# Patient Record
Sex: FEMALE | Race: WHITE | NOT HISPANIC OR LATINO | Employment: OTHER | URBAN - METROPOLITAN AREA
[De-identification: names, ages, dates, MRNs, and addresses within clinical notes are randomized per-mention and may not be internally consistent; named-entity substitution may affect disease eponyms.]

---

## 2017-03-02 ENCOUNTER — OFFICE VISIT - HEALTHEAST (OUTPATIENT)
Dept: FAMILY MEDICINE | Facility: CLINIC | Age: 47
End: 2017-03-02

## 2017-03-02 DIAGNOSIS — M25.571 RIGHT ANKLE PAIN: ICD-10-CM

## 2017-03-02 DIAGNOSIS — R76.8 HEPATITIS C ANTIBODY TEST POSITIVE: ICD-10-CM

## 2017-03-02 DIAGNOSIS — E66.3 OVERWEIGHT: ICD-10-CM

## 2017-03-02 DIAGNOSIS — Z00.00 WELL ADULT EXAM: ICD-10-CM

## 2017-03-02 DIAGNOSIS — Z72.0 TOBACCO ABUSE: ICD-10-CM

## 2017-03-02 LAB
CHOLEST SERPL-MCNC: 218 MG/DL
FASTING STATUS PATIENT QL REPORTED: YES
HDLC SERPL-MCNC: 54 MG/DL
LDLC SERPL CALC-MCNC: 147 MG/DL
TRIGL SERPL-MCNC: 87 MG/DL

## 2017-03-02 ASSESSMENT — MIFFLIN-ST. JEOR: SCORE: 1378.46

## 2017-03-03 LAB — HCV RNA DETECT/QUANT, S - HISTORICAL: NORMAL IU/ML

## 2017-03-06 ENCOUNTER — COMMUNICATION - HEALTHEAST (OUTPATIENT)
Dept: FAMILY MEDICINE | Facility: CLINIC | Age: 47
End: 2017-03-06

## 2017-03-07 LAB
HPV INTERPRETATION - HISTORICAL: NORMAL
HPV INTERPRETER - HISTORICAL: NORMAL

## 2017-03-08 LAB
BKR LAB AP ABNORMAL BLEEDING: NO
BKR LAB AP BIRTH CONTROL/HORMONES: NORMAL
BKR LAB AP CERVICAL APPEARANCE: NORMAL
BKR LAB AP GYN ADEQUACY: NORMAL
BKR LAB AP GYN INTERPRETATION: NORMAL
BKR LAB AP HPV REFLEX: NORMAL
BKR LAB AP LMP: NORMAL
BKR LAB AP PATIENT STATUS: NORMAL
BKR LAB AP PREVIOUS ABNORMAL: NO
BKR LAB AP PREVIOUS NORMAL: 2015
HIGH RISK?: NO
PATH REPORT.COMMENTS IMP SPEC: NORMAL
RESULT FLAG (HE HISTORICAL CONVERSION): NORMAL

## 2017-03-14 ENCOUNTER — COMMUNICATION - HEALTHEAST (OUTPATIENT)
Dept: FAMILY MEDICINE | Facility: CLINIC | Age: 47
End: 2017-03-14

## 2017-03-16 ENCOUNTER — HOSPITAL ENCOUNTER (OUTPATIENT)
Dept: MAMMOGRAPHY | Facility: HOSPITAL | Age: 47
Discharge: HOME | End: 2017-03-16
Attending: FAMILY MEDICINE

## 2017-03-16 DIAGNOSIS — Z00.00 WELL ADULT EXAM: ICD-10-CM

## 2017-05-23 ENCOUNTER — COMMUNICATION - HEALTHEAST (OUTPATIENT)
Dept: FAMILY MEDICINE | Facility: CLINIC | Age: 47
End: 2017-05-23

## 2017-05-23 DIAGNOSIS — Z80.8 FAMILY HISTORY OF SKIN CANCER: ICD-10-CM

## 2017-06-20 ENCOUNTER — COMMUNICATION - HEALTHEAST (OUTPATIENT)
Dept: SCHEDULING | Facility: CLINIC | Age: 47
End: 2017-06-20

## 2017-07-28 ENCOUNTER — COMMUNICATION - HEALTHEAST (OUTPATIENT)
Dept: FAMILY MEDICINE | Facility: CLINIC | Age: 47
End: 2017-07-28

## 2017-09-28 ENCOUNTER — COMMUNICATION - HEALTHEAST (OUTPATIENT)
Dept: SCHEDULING | Facility: CLINIC | Age: 47
End: 2017-09-28

## 2017-10-05 ENCOUNTER — AMBULATORY - HEALTHEAST (OUTPATIENT)
Dept: FAMILY MEDICINE | Facility: CLINIC | Age: 47
End: 2017-10-05

## 2017-10-09 ENCOUNTER — AMBULATORY - HEALTHEAST (OUTPATIENT)
Dept: NURSING | Facility: CLINIC | Age: 47
End: 2017-10-09

## 2017-10-09 DIAGNOSIS — Z23 ENCOUNTER FOR ZOSTAVAX ADMINISTRATION: ICD-10-CM

## 2018-04-12 ENCOUNTER — OFFICE VISIT - HEALTHEAST (OUTPATIENT)
Dept: FAMILY MEDICINE | Facility: CLINIC | Age: 48
End: 2018-04-12

## 2018-04-12 DIAGNOSIS — A60.00 GENITAL HERPES: ICD-10-CM

## 2018-04-12 DIAGNOSIS — R76.8 HEPATITIS C ANTIBODY TEST POSITIVE: ICD-10-CM

## 2018-04-12 DIAGNOSIS — Z12.31 VISIT FOR SCREENING MAMMOGRAM: ICD-10-CM

## 2018-04-12 DIAGNOSIS — Z12.11 ENCOUNTER FOR COLORECTAL CANCER SCREENING: ICD-10-CM

## 2018-04-12 DIAGNOSIS — Z00.01 ENCOUNTER FOR ROUTINE ADULT HEALTH EXAMINATION WITH ABNORMAL FINDINGS: ICD-10-CM

## 2018-04-12 DIAGNOSIS — Z12.12 ENCOUNTER FOR COLORECTAL CANCER SCREENING: ICD-10-CM

## 2018-04-12 DIAGNOSIS — E78.49 OTHER HYPERLIPIDEMIA: ICD-10-CM

## 2018-04-12 DIAGNOSIS — E66.3 OVERWEIGHT: ICD-10-CM

## 2018-04-12 LAB
ALBUMIN SERPL-MCNC: 3.8 G/DL (ref 3.5–5)
ALP SERPL-CCNC: 74 U/L (ref 45–120)
ALT SERPL W P-5'-P-CCNC: 17 U/L (ref 0–45)
ANION GAP SERPL CALCULATED.3IONS-SCNC: 7 MMOL/L (ref 5–18)
AST SERPL W P-5'-P-CCNC: 18 U/L (ref 0–40)
BILIRUB SERPL-MCNC: 0.5 MG/DL (ref 0–1)
BUN SERPL-MCNC: 22 MG/DL (ref 8–22)
CALCIUM SERPL-MCNC: 9.3 MG/DL (ref 8.5–10.5)
CHLORIDE BLD-SCNC: 107 MMOL/L (ref 98–107)
CHOLEST SERPL-MCNC: 215 MG/DL
CO2 SERPL-SCNC: 27 MMOL/L (ref 22–31)
CREAT SERPL-MCNC: 0.79 MG/DL (ref 0.6–1.1)
FASTING STATUS PATIENT QL REPORTED: YES
GFR SERPL CREATININE-BSD FRML MDRD: >60 ML/MIN/1.73M2
GLUCOSE BLD-MCNC: 78 MG/DL (ref 70–125)
HDLC SERPL-MCNC: 57 MG/DL
LDLC SERPL CALC-MCNC: 142 MG/DL
POTASSIUM BLD-SCNC: 4.6 MMOL/L (ref 3.5–5)
PROT SERPL-MCNC: 6.9 G/DL (ref 6–8)
SODIUM SERPL-SCNC: 141 MMOL/L (ref 136–145)
TRIGL SERPL-MCNC: 82 MG/DL

## 2018-04-12 ASSESSMENT — MIFFLIN-ST. JEOR: SCORE: 1337.31

## 2018-04-13 LAB — HCV RNA DETECT/QUANT, S: NORMAL IU/ML

## 2018-04-16 ENCOUNTER — COMMUNICATION - HEALTHEAST (OUTPATIENT)
Dept: FAMILY MEDICINE | Facility: CLINIC | Age: 48
End: 2018-04-16

## 2018-04-17 ENCOUNTER — HOSPITAL ENCOUNTER (OUTPATIENT)
Dept: ULTRASOUND IMAGING | Facility: HOSPITAL | Age: 48
Discharge: HOME | End: 2018-04-17
Attending: FAMILY MEDICINE

## 2018-04-17 ENCOUNTER — COMMUNICATION - HEALTHEAST (OUTPATIENT)
Dept: FAMILY MEDICINE | Facility: CLINIC | Age: 48
End: 2018-04-17

## 2018-04-17 DIAGNOSIS — R76.8 HEPATITIS C ANTIBODY TEST POSITIVE: ICD-10-CM

## 2018-05-14 ENCOUNTER — HOSPITAL ENCOUNTER (OUTPATIENT)
Dept: MAMMOGRAPHY | Facility: CLINIC | Age: 48
Discharge: HOME | End: 2018-05-14
Attending: FAMILY MEDICINE

## 2018-05-14 DIAGNOSIS — Z12.31 VISIT FOR SCREENING MAMMOGRAM: ICD-10-CM

## 2018-06-06 ENCOUNTER — COMMUNICATION - HEALTHEAST (OUTPATIENT)
Dept: FAMILY MEDICINE | Facility: CLINIC | Age: 48
End: 2018-06-06

## 2018-06-07 ENCOUNTER — RECORDS - HEALTHEAST (OUTPATIENT)
Dept: ADMINISTRATIVE | Facility: OTHER | Age: 48
End: 2018-06-07

## 2018-11-19 ENCOUNTER — COMMUNICATION - HEALTHEAST (OUTPATIENT)
Dept: SCHEDULING | Facility: CLINIC | Age: 48
End: 2018-11-19

## 2019-03-13 ENCOUNTER — COMMUNICATION - HEALTHEAST (OUTPATIENT)
Dept: FAMILY MEDICINE | Facility: CLINIC | Age: 49
End: 2019-03-13

## 2019-03-13 DIAGNOSIS — B00.9 HERPES SIMPLEX VIRUS INFECTION: ICD-10-CM

## 2019-05-21 ENCOUNTER — OFFICE VISIT - HEALTHEAST (OUTPATIENT)
Dept: FAMILY MEDICINE | Facility: CLINIC | Age: 49
End: 2019-05-21

## 2019-05-21 DIAGNOSIS — A60.04 HERPES SIMPLEX VULVOVAGINITIS: ICD-10-CM

## 2019-05-21 DIAGNOSIS — Z12.11 SCREEN FOR COLON CANCER: ICD-10-CM

## 2019-05-21 DIAGNOSIS — E78.49 OTHER HYPERLIPIDEMIA: ICD-10-CM

## 2019-05-21 DIAGNOSIS — Z00.00 ENCOUNTER FOR GENERAL ADULT MEDICAL EXAMINATION WITHOUT ABNORMAL FINDINGS: ICD-10-CM

## 2019-05-21 DIAGNOSIS — Z12.4 ENCOUNTER FOR SCREENING FOR CERVICAL CANCER: ICD-10-CM

## 2019-05-21 DIAGNOSIS — Z11.3 SCREEN FOR STD (SEXUALLY TRANSMITTED DISEASE): ICD-10-CM

## 2019-05-21 DIAGNOSIS — B00.9 HERPES SIMPLEX VIRUS INFECTION: ICD-10-CM

## 2019-05-21 DIAGNOSIS — Z12.31 VISIT FOR SCREENING MAMMOGRAM: ICD-10-CM

## 2019-05-21 DIAGNOSIS — R76.8 HEPATITIS C ANTIBODY TEST POSITIVE: ICD-10-CM

## 2019-05-21 LAB
ALBUMIN SERPL-MCNC: 4.3 G/DL (ref 3.5–5)
ALP SERPL-CCNC: 67 U/L (ref 45–120)
ALT SERPL W P-5'-P-CCNC: 13 U/L (ref 0–45)
ANION GAP SERPL CALCULATED.3IONS-SCNC: 10 MMOL/L (ref 5–18)
AST SERPL W P-5'-P-CCNC: 16 U/L (ref 0–40)
BILIRUB SERPL-MCNC: 0.4 MG/DL (ref 0–1)
BUN SERPL-MCNC: 25 MG/DL (ref 8–22)
CALCIUM SERPL-MCNC: 9.7 MG/DL (ref 8.5–10.5)
CHLORIDE BLD-SCNC: 103 MMOL/L (ref 98–107)
CHOLEST SERPL-MCNC: 229 MG/DL
CO2 SERPL-SCNC: 25 MMOL/L (ref 22–31)
CREAT SERPL-MCNC: 0.78 MG/DL (ref 0.6–1.1)
FASTING STATUS PATIENT QL REPORTED: YES
GFR SERPL CREATININE-BSD FRML MDRD: >60 ML/MIN/1.73M2
GLUCOSE BLD-MCNC: 87 MG/DL (ref 70–125)
HDLC SERPL-MCNC: 68 MG/DL
HIV 1+2 AB+HIV1 P24 AG SERPL QL IA: NEGATIVE
LDLC SERPL CALC-MCNC: 146 MG/DL
POTASSIUM BLD-SCNC: 4.5 MMOL/L (ref 3.5–5)
PROT SERPL-MCNC: 7.4 G/DL (ref 6–8)
SODIUM SERPL-SCNC: 138 MMOL/L (ref 136–145)
TRIGL SERPL-MCNC: 77 MG/DL

## 2019-05-21 ASSESSMENT — MIFFLIN-ST. JEOR: SCORE: 1331.18

## 2019-05-22 LAB
C TRACH DNA SPEC QL PROBE+SIG AMP: NEGATIVE
HPV SOURCE: NORMAL
HUMAN PAPILLOMA VIRUS 16 DNA: NEGATIVE
HUMAN PAPILLOMA VIRUS 18 DNA: NEGATIVE
HUMAN PAPILLOMA VIRUS FINAL DIAGNOSIS: NORMAL
HUMAN PAPILLOMA VIRUS OTHER HR: NEGATIVE
N GONORRHOEA DNA SPEC QL NAA+PROBE: NEGATIVE
SPECIMEN DESCRIPTION: NORMAL
T PALLIDUM AB SER QL: NEGATIVE

## 2019-05-23 LAB
HCV RNA SERPL NAA+PROBE-ACNC: NORMAL [IU]/ML
HCV RNA SERPL NAA+PROBE-LOG IU: NORMAL LOG IU/ML

## 2019-05-28 ENCOUNTER — COMMUNICATION - HEALTHEAST (OUTPATIENT)
Dept: FAMILY MEDICINE | Facility: CLINIC | Age: 49
End: 2019-05-28

## 2019-05-30 ENCOUNTER — COMMUNICATION - HEALTHEAST (OUTPATIENT)
Dept: FAMILY MEDICINE | Facility: CLINIC | Age: 49
End: 2019-05-30

## 2019-05-30 DIAGNOSIS — L98.9 SKIN LESION: ICD-10-CM

## 2019-06-07 ENCOUNTER — RECORDS - HEALTHEAST (OUTPATIENT)
Dept: MAMMOGRAPHY | Facility: CLINIC | Age: 49
End: 2019-06-07

## 2019-06-07 DIAGNOSIS — Z12.31 ENCOUNTER FOR SCREENING MAMMOGRAM FOR MALIGNANT NEOPLASM OF BREAST: ICD-10-CM

## 2019-10-09 ENCOUNTER — RECORDS - HEALTHEAST (OUTPATIENT)
Dept: ADMINISTRATIVE | Facility: OTHER | Age: 49
End: 2019-10-09

## 2020-08-18 ENCOUNTER — COMMUNICATION - HEALTHEAST (OUTPATIENT)
Dept: FAMILY MEDICINE | Facility: CLINIC | Age: 50
End: 2020-08-18

## 2020-08-18 DIAGNOSIS — B00.9 HERPES SIMPLEX VIRUS INFECTION: ICD-10-CM

## 2020-08-19 RX ORDER — VALACYCLOVIR HYDROCHLORIDE 500 MG/1
TABLET, FILM COATED ORAL
Qty: 24 TABLET | Refills: 3 | Status: SHIPPED | OUTPATIENT
Start: 2020-08-19 | End: 2021-10-01

## 2021-05-25 ENCOUNTER — RECORDS - HEALTHEAST (OUTPATIENT)
Dept: ADMINISTRATIVE | Facility: CLINIC | Age: 51
End: 2021-05-25

## 2021-05-27 ENCOUNTER — RECORDS - HEALTHEAST (OUTPATIENT)
Dept: ADMINISTRATIVE | Facility: CLINIC | Age: 51
End: 2021-05-27

## 2021-05-29 NOTE — TELEPHONE ENCOUNTER
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 Patient has a follow up appointment scheduled tomorrow 05/31/2019, and was informed she will need a referral prior to being treated, or she will be financially responsible for all services rendered.   Have you been seen for this request: No:  Appointment Offered:  declined  Does patient have a preference on a group/provider?   [facility]  Okay to leave a detailed message?  Yes

## 2021-05-30 VITALS — WEIGHT: 171.38 LBS | BODY MASS INDEX: 28.55 KG/M2 | HEIGHT: 65 IN

## 2021-06-01 VITALS — BODY MASS INDEX: 26.87 KG/M2 | WEIGHT: 161.25 LBS | HEIGHT: 65 IN

## 2021-06-03 VITALS — BODY MASS INDEX: 26.64 KG/M2 | WEIGHT: 159.9 LBS | HEIGHT: 65 IN

## 2021-06-09 NOTE — PROGRESS NOTES
Assessment:      Healthy female exam.    1. Well adult exam  Mammo Screening Bilateral    Lipid [place]    Gynecologic Cytology (PAP Smear)    CANCELED: Glucose   2. Tobacco abuse     3. Hepatitis C antibody test positive  Hepatitis C Virus (HCV) RNA Detection and Quantification by RT-PCR    Comprehensive Metabolic Panel   4. Right ankle pain     5. Overweight       The following high BMI interventions were performed this visit: encouragement to exercise  I have counseled the patient for tobacco cessation and the follow up will occur  at the next visit.     Plan:      I encouraged her to continue exercising and eating healthy diet.  We are going to check the above labs including a hepatitis C quant.  We discussed smoking cessation and I encouraged her to quit.  I informed her that she is not technically due for another Pap smear, but she is requesting to have this done.  The pain in her right ankle appears to be due to an ankle sprain.  She is not interested in having an x-ray which I think is appropriate since there is no bony tenderness and her symptoms are slowly, but steadily improving.     All questions answered.  Await pap smear results.  Discussed healthy lifestyle modifications.  Follow up as needed.     Subjective:      Lillian Hartman is a 46 y.o. female who presents for an annual exam. The patient is not sexually active. The patient participates in regular exercise: yes. The patient reports that there is not domestic violence in her life.  She denies having any concerns regarding hearing, vision, urination, bowel movements, sleep or mood.  She has a history of a positive hepatitis C antibody test, but her hepatitis C quadrant is undetectable.  She has been working out at a boxing gym for exercise.  She has been eating a healthy diet and has cut back on smoking to once a week.  She is concerned about right sided ankle pain.  She injured it jumping off a step about 2 months ago.  She denies having  swelling.  She is able to ambulate without much difficulty, but she describes having discomfort with jumping or if she is wearing high heels.    Social history: Single, not sexually active.  She works as a .    Healthy Habits:   Regular Exercise: Yes  Sunscreen Use: Yes  Healthy Diet: Yes  Dental Visits Regularly: Yes  Seat Belt: Yes  Sexually active: No  Self Breast Exam Monthly:Yes  Hemoccults: No  Flex Sig: No  Colonoscopy: Yes diagnostic years ago.  Lipid Profile: Yes 10/29/15  Glucose Screen: Yes 12/03/15  Prevention of Osteoporosis: No  Last Dexa: No  Guns at Home:  N/A      Immunization History   Administered Date(s) Administered     Hep B, historic 09/14/2005, 10/24/2005     Influenza, inj, historic 09/18/2014, 10/06/2015, 11/01/2016     Td, historic 09/14/2005     Tdap 10/02/2009     Immunization status: up to date and documented.    No exam data present    Gynecologic History  No LMP recorded. Patient is postmenopausal.  Contraception: none  Last Pap: 10/27/15. Results were: normal  Last mammogram: 11/02/15. Results were: normal      OB History   No data available       Current Outpatient Prescriptions   Medication Sig Dispense Refill     ascorbic acid (ASCORBIC ACID WITH DANIEL HIPS) 500 MG tablet Take 500 mg by mouth daily.       ERGOCALCIFEROL, VITAMIN D2, (VITAMIN D2 ORAL) Take by mouth.       OMEGA-3/DHA/EPA/FISH OIL (FISH OIL-OMEGA-3 FATTY ACIDS) 300-1,000 mg capsule Take 2 g by mouth daily.       vitamin E 400 UNIT capsule Take 400 Units by mouth daily.       No current facility-administered medications for this visit.      No past medical history on file.  No past surgical history on file.  Review of patient's allergies indicates no known allergies.  Family History   Problem Relation Age of Onset     Lung cancer Mother      Hypertension Mother      Hypertension Father      BRCA 1/2 Neg Hx      Endometrial cancer Neg Hx      Ovarian cancer Neg Hx      Social History     Social History      "Marital status: Single     Spouse name: N/A     Number of children: N/A     Years of education: N/A     Occupational History     Not on file.     Social History Main Topics     Smoking status: Current Some Day Smoker     Types: Cigarettes     Smokeless tobacco: Not on file      Comment: socially.     Alcohol use Yes      Comment: one day a week.     Drug use: No     Sexual activity: Not on file     Other Topics Concern     Not on file     Social History Narrative       Review of Systems  General:  Denies problem  Eyes: Denies problem  Ears/Nose/Throat: Denies problem  Cardiovascular: Denies problem  Respiratory:  Denies problem  Gastrointestinal:  Denies problem, Genitourinary: Denies problem  Musculoskeletal:  Denies problem  Skin: Denies problem  Neurologic: Denies problem  Psychiatric: Denies problem  Endocrine: Denies problem  Heme/Lymphatic: Denies problem   Allergic/Immunologic: Denies problem        Objective:         Vitals:    03/02/17 0654   BP: 100/74   Pulse: 68   Resp: 16   Temp: 98.6  F (37  C)   TempSrc: Oral   Weight: 171 lb 6 oz (77.7 kg)   Height: 5' 4.7\" (1.643 m)     Body mass index is 28.78 kg/(m^2).    Physical Exam:  General Appearance: Alert, cooperative, no distress, appears stated age  Head: Normocephalic, without obvious abnormality, atraumatic  Eyes: PERRL, conjunctiva/corneas clear, EOM's intact  Ears: Normal TM's and external ear canals, both ears  Nose: Nares normal, septum midline,mucosa normal, no drainage  Throat: Lips, mucosa, and tongue normal; teeth and gums normal  Neck: Supple, symmetrical, trachea midline, no adenopathy;  thyroid: not enlarged, symmetric, no tenderness/mass/nodules  Back: Symmetric, no curvature, ROM normal, no CVA tenderness  Lungs: Clear to auscultation bilaterally, respirations unlabored  Breasts: No breast masses, tenderness, asymmetry, or nipple discharge.  Heart: Regular rate and rhythm, S1 and S2 normal, no murmur, rub, or gallop,   Abdomen: Soft, " non-tender, bowel sounds active all four quadrants,  no masses, no organomegaly  Pelvic: Normal appearing external female genitalia.  Normal-appearing vagina and cervix.  No adnexal masses or cervical motion tenderness on bimanual exam.  Extremities: Extremities normal, atraumatic, no cyanosis or edema.  Trace tenderness over the right anterior lateral ankle.  No bony tenderness.  Eversion maneuver the right ankle reproduces discomfort.  Skin: Skin color, texture, turgor normal, no rashes or lesions  Lymph nodes: Cervical, supraclavicular, and axillary nodes normal  Neurologic: Normal

## 2021-06-15 PROBLEM — E66.3 OVERWEIGHT: Status: ACTIVE | Noted: 2017-03-02

## 2021-06-16 PROBLEM — E78.5 HYPERLIPIDEMIA: Status: ACTIVE | Noted: 2018-04-12

## 2021-06-17 NOTE — PROGRESS NOTES
FEMALE PREVENTATIVE EXAM    Assessment and Plan:       1. Encounter for routine adult health examination with abnormal findings  Appears to be in good health.  I encouraged her to stay physically active and to eat healthy diet.    2. Hepatitis C antibody test positive  Historically her HCV quant testing has been undetectable.  She has declined GI referral is in the past.  - Hepatitis C Virus (HCV) RNA Detection and Quantification by RT-PCR ($$$)  - US Abdomen Limited; Future    3. Other hyperlipidemia  - Comprehensive Metabolic Panel  - Lipid Cascade    4. Overweight  She will keep working on getting regular exercise and eating a healthy diet    5. Genital herpes  She will continue to monitor and avoid sexual activity during outbreaks    6. Visit for screening mammogram  - Mammo Screening Bilateral; Future    7. Encounter for colorectal cancer screening  - Ambulatory referral for Colonoscopy     Next follow up:  No Follow-up on file.    Immunization Review  Adult Imm Review: No immunizations due today  She recently quit smoking.    I discussed the following with the patient:   Adult Healthy Living: Importance of regular exercise  Healthy nutrition  Getting adequate sleep        Subjective:   Chief Complaint: Lillian Hartman is an 48 y.o. female here for a preventative health visit.     HPI: She denies concerns regarding hearing, vision, urination, bowel movements or mood.  She occasionally wakes up at night and has some difficulty falling back to sleep.  She reports that this is not causing significant problems for her.    Social history: She has a boyfriend.  She is planning to quit her job as a .    Healthy Habits  Are you taking a daily aspirin? No  Do you typically exercising at least 40 min, 3-4 times per week?  Yes  Do you usually eat at least 4 servings of fruit and vegetables a day, include whole grains and fiber and avoid regularly eating high fat foods? Yes  Have you had an eye exam in the past  "two years? NO has scheduled  Do you see a dentist twice per year? Yes  Do you have any concerns regarding sleep? YES sometimes uses Benadryl at HS to help sleep    Safety Screen  If you own firearms, are they secured in a locked gun cabinet or with trigger locks? The patient does not own any firearms  Do you feel you are safe where you are living?: Yes (4/12/2018  7:01 AM)  Do you feel you are safe in your relationship(s)?: Yes (4/12/2018  7:01 AM)    Review of Systems:  Please see above.  The rest of the review of systems are negative for all systems.     Pap History:   She had a normal Pap smear on 3/2/17  Cancer Screening       Status Date      MAMMOGRAM Overdue 3/16/2018      Done 3/16/2017 MAMMO SCREENING BILATERAL     Patient has more history with this topic...    COLONOSCOPY Overdue 3/25/2018     PAP SMEAR Next Due 3/2/2022      Done 3/2/2017 GYNECOLOGIC CYTOLOGY (PAP SMEAR)     Patient has more history with this topic...              History     Reviewed By Date/Time Sections Reviewed    Joanie Salinas CMA 4/12/2018  7:00 AM Tobacco            Objective:   Vital Signs:   Visit Vitals     /70 (Patient Site: Left Arm, Patient Position: Sitting, Cuff Size: Adult Regular)     Pulse 68     Temp 98.4  F (36.9  C) (Oral)     Resp 16     Ht 5' 5\" (1.651 m)     Wt 161 lb 4 oz (73.1 kg)     BMI 26.83 kg/m2          PHYSICAL EXAM  General Appearance: Alert, cooperative, no distress, appears stated age  Head: Normocephalic, without obvious abnormality, atraumatic  Eyes: PERRL, conjunctiva/corneas clear, EOM's intact  Ears: Normal TM's and external ear canals, both ears  Nose: Nares normal, septum midline,mucosa normal, no drainage  Throat: Lips, mucosa, and tongue normal; teeth and gums normal  Neck: Supple, symmetrical, trachea midline, no adenopathy;  thyroid: not enlarged, symmetric, no tenderness/mass/nodules  Back: Symmetric, no curvature, ROM normal, no CVA tenderness  Lungs: Clear to auscultation " bilaterally, respirations unlabored  Breasts: No breast masses, tenderness, asymmetry, or nipple discharge.  Heart: Regular rate and rhythm, S1 and S2 normal, no murmur, rub, or gallop   Abdomen: Soft, non-tender, bowel sounds active all four quadrants,  no masses, no organomegaly  Pelvic:Not examined  Extremities: Extremities normal, atraumatic, no cyanosis or edema  Skin: Skin color, texture, turgor normal, no rashes or lesions  Lymph nodes: Cervical, supraclavicular, and axillary nodes normal  Neurologic: Alert.  Normal speech.  No focal deficits.  Deep tendon reflexes normal bilaterally                  Additional Screenings Completed Today:

## 2021-06-19 NOTE — LETTER
Letter by Scot Alcocer DO at      Author: Scot Alcocer DO Service: -- Author Type: --    Filed:  Encounter Date: 5/28/2019 Status: (Other)         Lillian Hartman  [address]  [facility] [address]             May 28, 2019         Dear MsKell Hartman,    Below are the results from your recent visit:    Resulted Orders   Gynecologic Cytology (PAP Smear)   Result Value Ref Range    Case Report       Gynecologic Cytology Report                       Case: [id number]                                   Authorizing Provider:  Scot Alcocer      Collected:           05/21/2019 08Max Simpson DO                                                                   Ordering Location:     [facility]    Received:            05/21/2019 0831                                     Medicine                                                                     First Screen:          Julianna Millan CT                                                                            (ASCP)                                                                       Specimen:    SUREPATH PAP, SCREENING, Endocervical/cervical                                             Interpretation  Negative for squamous intraepithelial lesion or malignancy.      Negative for squamous intraepithelial lesion or malignancy    Result Flag Normal Normal    Specimen Adequacy       Satisfactory for evaluation, endocervical/transformation zone component present    HPV Reflex? Yes regardless of result     HIGH RISK No     LMP/Menopause Date 38 years old     Abnormal Bleeding No     Pt Status N/A     Birth Control/Hormones None     Previous Normal/Date 3/2/17     Prev Abn Date/Dx Yes, in her 30s     Cervical Appearance Normal    Hepatitis C RNA Quantitation by RT-PCR   Result Value Ref Range    HCV RNA Quant HCV RNA Not Detected HCVND [IU]/mL      Comment:      The DEEPAK  AmpliPrep/DEEPAK TaqMan HCV Test is an FDA-approved in vitro nucleic  acid amplification test for the quantitation of HCV RNA in human plasma (ETDA  plasma) or serum using the DEEPAK AmpliPrep Instrument for automated viral  nucleic acid extraction and the DEEPAK TaqMan Analyzer or DEEPAK TaqMan for  automated Real Time PCR amplification and detection of the viral nucleic acid  target.  Titer results are reported in International Units/mL (IU/mL) using the 1st WHO  International standard for HCV for Nucleic Acid Amplification based assays.    Log of HCV RNA Qt Not Calculated <1.2 Log IU/mL      Comment:      Performed and/or entered by:  [facility]  [address] [place] [address]   Comprehensive Metabolic Panel   Result Value Ref Range    Sodium 138 136 - 145 mmol/L    Potassium 4.5 3.5 - 5.0 mmol/L    Chloride 103 98 - 107 mmol/L    CO2 25 22 - 31 mmol/L    Anion Gap, Calculation 10 5 - 18 mmol/L    Glucose 87 70 - 125 mg/dL    BUN 25 (H) 8 - 22 mg/dL    Creatinine 0.78 0.60 - 1.10 mg/dL    GFR MDRD Af Amer >60 >60 mL/min/1.73m2    GFR MDRD Non Af Amer >60 >60 mL/min/1.73m2    Bilirubin, Total 0.4 0.0 - 1.0 mg/dL    Calcium 9.7 8.5 - 10.5 mg/dL    Protein, Total 7.4 6.0 - 8.0 g/dL    Albumin 4.3 3.5 - 5.0 g/dL    Alkaline Phosphatase 67 45 - 120 U/L    AST 16 0 - 40 U/L    ALT 13 0 - 45 U/L    Narrative    Fasting Glucose reference range is 70-99 mg/dL per  American Diabetes Association (ADA) guidelines.   Lipid Cascade   Result Value Ref Range    Cholesterol 229 (H) <=199 mg/dL    Triglycerides 77 <=149 mg/dL    HDL Cholesterol 68 >=50 mg/dL    LDL Calculated 146 (H) <=129 mg/dL    Patient Fasting > 8hrs? Yes    Chlamydia trachomatis & Neisseria gonorrhoeae, Amplified Detection   Result Value Ref Range    Chlamydia trachomatis, Amplified Detection Negative Negative    Neisseria gonorrhoeae, Amplified Detection Negative Negative   HIV Antigen/Antibody Screening Cascade   Result  Value Ref Range    HIV Antigen / Antibody Negative Negative    Narrative    Method is Abbott HIV Ag/Ab for the detection of HIV p24 antigen, HIV-1 antibodies and HIV-2 antibodies.   Syphilis Screen, Cascade   Result Value Ref Range    Treponema Antibody (Syphilis) Negative Negative   HPV High Risk DNA Cervical   Result Value Ref Range    HPV Source SurePath     HPV16 DNA Negative NEG    HPV18 DNA Negative NEG    Other HR HPV Negative NEG    Final Diagnosis SEE NOTES       Comment:      This patient's sample is negative for HPV DNA.  This test was developed and its performance characteristics determined by the  [facility]. It  has not been cleared or approved by the FDA. The laboratory is regulated under  CLIA as qualified to perform high-complexity testing. This test is used for  clinical purposes. It should not be regarded as investigational or for  research.  (Note)  METHODOLOGY:  The Roche jacey 4800 system uses automated extraction,  simultaneous amplification of HPV (L1 region) and beta-globin,  followed by  real time detection of fluorescent labeled HPV and beta  globin using specific oligonucleotide probes . The test specifically  identifies types HPV 16 DNA and HPV 18 DNA while concurrently  detecting the rest of the high risk types (31, 33, 35, 39, 45, 51,  52, 56, 58, 59, 66 or 68).    COMMENTS:  This test is not intended for use as a screening device  for women under age 30 with normal cervical   cytology.  Results should  be correlated with cytologic and histologic findings. Close clinical  followup is recommended.        Specimen Description Cervical Cells       Comment:      Performed and/or entered by:  [address] [address]        The test results show that your cholesterol is mildly elevated. I recommend eating a healthy diet and regular exercise.  The Pap smear results are  normal. The rest of your labs look great!  I recommend that we repeat these labs, with the exception of the Pap smear, in 1 year.    Please call with questions or contact us using [employer].    Sincerely,        Electronically signed by Scot Lopez, DO

## 2021-06-24 NOTE — TELEPHONE ENCOUNTER
Refill Approved    Rx renewed per Medication Renewal Policy. Medication was last renewed on 4.12.18.    Shonna Dee, [facility] Triage/Med Refill 3/13/2019     Requested Prescriptions   Pending Prescriptions Disp Refills     valACYclovir (VALTREX) 500 MG tablet 24 tablet 2     Sig: TAKE 1 TABLET BY MOUTH TWICE DAILY FOR 3 DAYS AS NEEDED FOR OUTBREAKS    Antivirals Refill Protocol Passed - 3/13/2019  7:20 AM       Passed - Renal function done in last year    Creatinine   Date Value Ref Range Status   06/01/2018 0.76 0.60 - 1.10 mg/dL Final            Passed - Visit with PCP or prescribing provider visit in past 12 months or next 3 months    Last office visit with prescriber/PCP: Visit date not found OR same dept: Visit date not found OR same specialty: Visit date not found  Last physical: 4/12/2018 Last MTM visit: Visit date not found   Next visit within 3 mo: Visit date not found  Next physical within 3 mo: Visit date not found  Prescriber OR PCP: Scot Lopez,   Last diagnosis associated with med order: There are no diagnoses linked to this encounter.  If protocol passes may refill for 12 months if within 3 months of last provider visit (or a total of 15 months).            Passed - Patient does not have active pregnancy episode       Passed - Patient has not had positive pregnancy test in last 280 days    No results found for: HCGQUAL, PREGTESTUR, PREGSERUM, BHCG
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
## 2021-07-22 ENCOUNTER — RECORDS - HEALTHEAST (OUTPATIENT)
Dept: LAB | Facility: CLINIC | Age: 51
End: 2021-07-22

## 2021-07-22 DIAGNOSIS — Z12.31 OTHER SCREENING MAMMOGRAM: ICD-10-CM

## 2021-09-27 ENCOUNTER — OFFICE VISIT (OUTPATIENT)
Dept: FAMILY MEDICINE | Facility: CLINIC | Age: 51
End: 2021-09-27
Payer: COMMERCIAL

## 2021-09-27 VITALS
HEIGHT: 66 IN | HEART RATE: 80 BPM | SYSTOLIC BLOOD PRESSURE: 118 MMHG | WEIGHT: 173 LBS | BODY MASS INDEX: 27.8 KG/M2 | DIASTOLIC BLOOD PRESSURE: 80 MMHG | OXYGEN SATURATION: 100 %

## 2021-09-27 DIAGNOSIS — R76.8 HEPATITIS C ANTIBODY TEST POSITIVE: ICD-10-CM

## 2021-09-27 DIAGNOSIS — N95.1 MENOPAUSAL SYNDROME (HOT FLASHES): ICD-10-CM

## 2021-09-27 DIAGNOSIS — Z13.220 SCREENING FOR LIPID DISORDERS: ICD-10-CM

## 2021-09-27 DIAGNOSIS — Z00.00 ROUTINE GENERAL MEDICAL EXAMINATION AT A HEALTH CARE FACILITY: Primary | ICD-10-CM

## 2021-09-27 PROCEDURE — 90686 IIV4 VACC NO PRSV 0.5 ML IM: CPT | Performed by: FAMILY MEDICINE

## 2021-09-27 PROCEDURE — 90471 IMMUNIZATION ADMIN: CPT | Performed by: FAMILY MEDICINE

## 2021-09-27 PROCEDURE — 99396 PREV VISIT EST AGE 40-64: CPT | Mod: 25 | Performed by: FAMILY MEDICINE

## 2021-09-27 RX ORDER — PAROXETINE 10 MG/1
TABLET, FILM COATED ORAL
COMMUNITY
Start: 2021-09-08 | End: 2021-09-27

## 2021-09-27 RX ORDER — PAROXETINE 10 MG/1
10 TABLET, FILM COATED ORAL EVERY MORNING
Qty: 90 TABLET | Refills: 1 | Status: SHIPPED | OUTPATIENT
Start: 2021-09-27 | End: 2022-04-22

## 2021-09-27 ASSESSMENT — ANXIETY QUESTIONNAIRES
3. WORRYING TOO MUCH ABOUT DIFFERENT THINGS: NOT AT ALL
1. FEELING NERVOUS, ANXIOUS, OR ON EDGE: NOT AT ALL
5. BEING SO RESTLESS THAT IT IS HARD TO SIT STILL: NOT AT ALL
IF YOU CHECKED OFF ANY PROBLEMS ON THIS QUESTIONNAIRE, HOW DIFFICULT HAVE THESE PROBLEMS MADE IT FOR YOU TO DO YOUR WORK, TAKE CARE OF THINGS AT HOME, OR GET ALONG WITH OTHER PEOPLE: NOT DIFFICULT AT ALL
7. FEELING AFRAID AS IF SOMETHING AWFUL MIGHT HAPPEN: NOT AT ALL
GAD7 TOTAL SCORE: 0
6. BECOMING EASILY ANNOYED OR IRRITABLE: NOT AT ALL
2. NOT BEING ABLE TO STOP OR CONTROL WORRYING: NOT AT ALL
4. TROUBLE RELAXING: NOT AT ALL

## 2021-09-27 ASSESSMENT — PATIENT HEALTH QUESTIONNAIRE - PHQ9: SUM OF ALL RESPONSES TO PHQ QUESTIONS 1-9: 0

## 2021-09-27 ASSESSMENT — MIFFLIN-ST. JEOR: SCORE: 1398.53

## 2021-09-27 NOTE — PROGRESS NOTES
SUBJECTIVE:   CC: Lillian Hartman is an 51 year old woman who presents for preventive health visit.   She is here for physical exam.  And noted that she is not fasting but is willing to come in fasting for labs.  She would also like to have the labs done to keep an eye on hepatitis C antibody positivity.  She has been having a yearly check for the RNA but that has been negative.  Noted that she is taking some medication for menopausal symptoms with hot flashes.  Has been taking paroxetine which has been very helpful.  Wants to get a refill of that medication at this time.  She also does note that she is doing well.  Does not have any major concerns today.      Patient has been advised of split billing requirements and indicates understanding: Yes  Healthy Habits:     Getting at least 3 servings of Calcium per day:  Yes    Bi-annual eye exam:  Yes    Dental care twice a year:  Yes    Sleep apnea or symptoms of sleep apnea:  None    Diet:  Low fat/cholesterol    Frequency of exercise:  4-5 days/week    Duration of exercise:  30-45 minutes    Taking medications regularly:  No    Medication side effects:  None    PHQ-2 Total Score: 0    Additional concerns today:  Yes        Today's PHQ-2 Score:   PHQ-2 ( 1999 [employer]) 9/27/2021   Q1: Little interest or pleasure in doing things 0   Q2: Feeling down, depressed or hopeless 0   PHQ-2 Score 0   Q1: Little interest or pleasure in doing things Not at all   Q2: Feeling down, depressed or hopeless Not at all   PHQ-2 Score 0       Abuse: Current or Past (Physical, Sexual or Emotional) - No  Do you feel safe in your environment? Yes    Have you ever done Advance Care Planning? (For example, a Health Directive, POLST, or a discussion with a medical provider or your loved ones about your wishes): No, advance care planning information given to patient to review.  Patient plans to discuss their wishes with loved ones or provider.      Social History     Tobacco Use     Smoking  status: Former Smoker     Types: Cigarettes, Cigarettes     Quit date: 1/7/2018     Years since quitting: 3.7     Smokeless tobacco: Never Used     Tobacco comment: socially.   Substance Use Topics     Alcohol use: Yes     Comment: Alcoholic Drinks/day: one day a week.     If you drink alcohol do you typically have >3 drinks per day or >7 drinks per week? No    Alcohol Use 9/27/2021   Prescreen: >3 drinks/day or >7 drinks/week? No   No flowsheet data found.    Reviewed orders with patient.  Reviewed health maintenance and updated orders accordingly - Yes      Breast Cancer Screening:  Any new diagnosis of family breast, ovarian, or bowel cancer? No    FHS-7: No flowsheet data found.  click delete button to remove this line now    Pertinent mammograms are reviewed under the imaging tab.    PAP / HPV Latest Ref Rng & Units 5/21/2019 3/2/2017 10/27/2015   PAP Negative for squamous intraepithelial lesion or malignancy. Negative for squamous intraepithelial lesion or malignancy  Electronically signed by Julianna Millan CT (ASCP) on 5/28/2019 at  2:08 PM   Negative for squamous intraepithelial lesion or malignancy  Electronically signed by Patrice Abernathy CT (ASCP) on 3/8/2017 at  3:25 PM   Negative for squamous intraepithelial lesion or malignancy  Electronically signed by Gemma Gaspar CT (ASCP) on 11/9/2015 at  2:00 PM     HPV16 NEG Negative - -   HPV18 NEG Negative - -   HRHPV NEG Negative - -     Reviewed and updated as needed this visit by clinical staff  Tobacco  Allergies  Meds   Med Hx  Surg Hx  Fam Hx  Soc Hx        Reviewed and updated as needed this visit by Provider  Tobacco     Med Hx  Surg Hx  Fam Hx  Soc Hx       Family History   Problem Relation Age of Onset     Lung Cancer Mother      Hypertension Mother      Hypertension Father      Hereditary Breast and Ovarian Cancer Syndrome No family hx of      Endometrial Cancer No family hx of      Ovarian Cancer No family hx of        Social History     Socioeconomic History     Marital status: Single     Spouse name: Not on file     Number of children: Not on file     Years of education: Not on file     Highest education level: Not on file   Occupational History     Not on file   Tobacco Use     Smoking status: Former Smoker     Types: Cigarettes, Cigarettes     Quit date: 1/7/2018     Years since quitting: 3.7     Smokeless tobacco: Never Used     Tobacco comment: socially.   Substance and Sexual Activity     Alcohol use: Yes     Comment: Alcoholic Drinks/day: one day a week.     Drug use: No     Sexual activity: Not on file   Other Topics Concern     Not on file   Social History Narrative     Not on file     Social Determinants of Health     Financial Resource Strain:      Difficulty of Paying Living Expenses:    Food Insecurity:      Worried About Running Out of Food in the Last Year:      Ran Out of Food in the Last Year:    Transportation Needs:      Lack of Transportation (Medical):      Lack of Transportation (Non-Medical):    Physical Activity:      Days of Exercise per Week:      Minutes of Exercise per Session:    Stress:      Feeling of Stress :    Social Connections:      Frequency of Communication with Friends and Family:      Frequency of Social Gatherings with Friends and Family:      Attends [employer] Services:      Active Member of Clubs or Organizations:      Attends Club or Organization Meetings:      Marital Status:    Intimate Partner Violence:      Fear of Current or Ex-Partner:      Emotionally Abused:      Physically Abused:      Sexually Abused:       History reviewed. No pertinent surgical history.   History reviewed. No pertinent past medical history.       Review of Systems  CONSTITUTIONAL: NEGATIVE for fever, chills, change in weight  INTEGUMENTARY/SKIN: NEGATIVE for worrisome rashes, moles or lesions  EYES: NEGATIVE for vision changes or irritation  ENT: NEGATIVE for ear, mouth and throat problems  RESP: NEGATIVE  "for significant cough or SOB  BREAST: NEGATIVE for masses, tenderness or discharge  CV: NEGATIVE for chest pain, palpitations or peripheral edema  GI: NEGATIVE for nausea, abdominal pain, heartburn, or change in bowel habits  : NEGATIVE for unusual urinary or vaginal symptoms. No vaginal bleeding.  MUSCULOSKELETAL: NEGATIVE for significant arthralgias or myalgia  NEURO: NEGATIVE for weakness, dizziness or paresthesias  ENDOCRINE: Taking medication for menopausal symptoms.  PSYCHIATRIC: NEGATIVE for changes in mood or affect      OBJECTIVE:   /80 (BP Location: Left arm, Patient Position: Sitting, Cuff Size: Adult Regular)   Pulse 80   Ht 1.664 m (5' 5.5\")   Wt 78.5 kg (173 lb)   SpO2 100%   Breastfeeding No   BMI 28.35 kg/m    Physical Exam  GENERAL APPEARANCE: healthy, alert and no distress  EYES: Eyes grossly normal to inspection, PERRL and conjunctivae and sclerae normal  HENT: ear canals and TM's normal, nose and mouth without ulcers or lesions, oropharynx clear and oral mucous membranes moist  NECK: no adenopathy, no asymmetry, masses, or scars and thyroid normal to palpation  RESP: lungs clear to auscultation - no rales, rhonchi or wheezes  BREAST: normal without masses, tenderness or nipple discharge and no palpable axillary masses or adenopathy  CV: regular rate and rhythm, normal S1 S2, no S3 or S4, no murmur, click or rub, no peripheral edema and peripheral pulses strong  ABDOMEN: soft, nontender, no hepatosplenomegaly, no masses and bowel sounds normal  MS: no musculoskeletal defects are noted and gait is age appropriate without ataxia  SKIN: no suspicious lesions or rashes  NEURO: Normal strength and tone, sensory exam grossly normal, mentation intact and speech normal  PSYCH: mentation appears normal and affect normal/bright      ASSESSMENT/PLAN:   Lillian was seen today for physical and hot flashes.    Diagnoses and all orders for this visit:    Routine general medical examination at a " "health care facility  -     Lipid panel reflex to direct LDL Fasting; Future  -     Comprehensive metabolic panel (BMP + Alb, Alk Phos, ALT, AST, Total. Bili, TP); Future    Screening for lipid disorders    Hepatitis C antibody test positive  -     Hepatitis C RNA quantitative; Future    Menopausal syndrome (hot flashes)  -     PARoxetine (PAXIL) 10 MG tablet; Take 1 tablet (10 mg) by mouth every morning    Other orders  -     KS FLU VAC PRESRV FREE QUAD SPLIT VIR IM  MONTHS IM  Will I refill her medication for the paroxetine.  Labs were also put in for the hepatitis C quantitative RNA.  She will also update her influenza shot.  Will inform her of her lab results as it comes back.      Patient has been advised of split billing requirements and indicates understanding: Yes  COUNSELING:  Reviewed preventive health counseling, as reflected in patient instructions  Special attention given to:        Regular exercise       Healthy diet/nutrition       Osteoporosis prevention/bone health    Estimated body mass index is 28.35 kg/m  as calculated from the following:    Height as of this encounter: 1.664 m (5' 5.5\").    Weight as of this encounter: 78.5 kg (173 lb).    Weight management plan: Discussed healthy diet and exercise guidelines    She reports that she quit smoking about 3 years ago. Her smoking use included cigarettes and cigarettes. She has never used smokeless tobacco.      Counseling Resources:  ATP IV Guidelines  Pooled Cohorts Equation Calculator  Breast Cancer Risk Calculator  BRCA-Related Cancer Risk Assessment: FHS-7 Tool  FRAX Risk Assessment  ICSI Preventive Guidelines  Dietary Guidelines for Americans, 2010  USDA's MyPlate  ASA Prophylaxis  Lung CA Screening    Clinton Flores MD  [facility]  "

## 2021-09-29 ENCOUNTER — LAB (OUTPATIENT)
Dept: LAB | Facility: CLINIC | Age: 51
End: 2021-09-29
Payer: COMMERCIAL

## 2021-09-29 DIAGNOSIS — Z00.00 ROUTINE GENERAL MEDICAL EXAMINATION AT A HEALTH CARE FACILITY: ICD-10-CM

## 2021-09-29 DIAGNOSIS — R76.8 HEPATITIS C ANTIBODY TEST POSITIVE: ICD-10-CM

## 2021-09-29 LAB
ALBUMIN SERPL-MCNC: 3.9 G/DL (ref 3.5–5)
ALP SERPL-CCNC: 62 U/L (ref 45–120)
ALT SERPL W P-5'-P-CCNC: 110 U/L (ref 0–45)
ANION GAP SERPL CALCULATED.3IONS-SCNC: 8 MMOL/L (ref 5–18)
AST SERPL W P-5'-P-CCNC: 54 U/L (ref 0–40)
BILIRUB SERPL-MCNC: 0.3 MG/DL (ref 0–1)
BUN SERPL-MCNC: 17 MG/DL (ref 8–22)
CALCIUM SERPL-MCNC: 9.1 MG/DL (ref 8.5–10.5)
CHLORIDE BLD-SCNC: 108 MMOL/L (ref 98–107)
CHOLEST SERPL-MCNC: 231 MG/DL
CO2 SERPL-SCNC: 26 MMOL/L (ref 22–31)
CREAT SERPL-MCNC: 0.83 MG/DL (ref 0.6–1.1)
FASTING STATUS PATIENT QL REPORTED: ABNORMAL
GFR SERPL CREATININE-BSD FRML MDRD: 81 ML/MIN/1.73M2
GLUCOSE BLD-MCNC: 86 MG/DL (ref 70–125)
HDLC SERPL-MCNC: 72 MG/DL
LDLC SERPL CALC-MCNC: 146 MG/DL
POTASSIUM BLD-SCNC: 4.4 MMOL/L (ref 3.5–5)
PROT SERPL-MCNC: 6.9 G/DL (ref 6–8)
SODIUM SERPL-SCNC: 142 MMOL/L (ref 136–145)
TRIGL SERPL-MCNC: 67 MG/DL

## 2021-09-29 PROCEDURE — 80061 LIPID PANEL: CPT

## 2021-09-29 PROCEDURE — 36415 COLL VENOUS BLD VENIPUNCTURE: CPT

## 2021-09-29 PROCEDURE — 87522 HEPATITIS C REVRS TRNSCRPJ: CPT

## 2021-09-29 PROCEDURE — 80053 COMPREHEN METABOLIC PANEL: CPT

## 2021-09-30 LAB — HCV RNA SERPL NAA+PROBE-ACNC: NOT DETECTED IU/ML

## 2021-10-01 DIAGNOSIS — B00.9 HERPES SIMPLEX VIRUS INFECTION: ICD-10-CM

## 2021-10-01 RX ORDER — VALACYCLOVIR HYDROCHLORIDE 500 MG/1
TABLET, FILM COATED ORAL
Qty: 24 TABLET | Refills: 3 | Status: SHIPPED | OUTPATIENT
Start: 2021-10-01 | End: 2022-04-21

## 2021-10-01 NOTE — TELEPHONE ENCOUNTER
"Last Written Prescription Date: 8/19/20  Last Fill Quantity: 24,  # refills: 3  Last office visit provider:  9/27/21    Requested Prescriptions   Pending Prescriptions Disp Refills     valACYclovir (VALTREX) 500 MG tablet 24 tablet 3     Sig: [VALACYCLOVIR (VALTREX) 500 MG TABLET] TAKE 1 TABLET BY MOUTH TWICE DAILY FOR 3 DAYS AS NEEDED FOR OUTBREAKS       Antivirals for Herpes Protocol Failed - 10/1/2021  3:37 PM        Failed - Recent (12 mo) or future (30 days) visit within the authorizing provider's specialty     Patient has had an office visit with the authorizing provider or a provider within the authorizing providers department within the previous 12 mos or has a future within next 30 days. See \"Patient Info\" tab in inbasket, or \"Choose Columns\" in Meds & Orders section of the refill encounter.              Passed - Patient is age 12 or older        Passed - Medication is active on med list        Passed - Normal serum creatinine on file in past 12 months     Recent Labs   Lab Test 09/29/21  0934   CR 0.83       Ok to refill medication if creatinine is low               Erendira pérez RN 10/01/21 3:50 PM  "

## 2022-04-20 DIAGNOSIS — N95.1 MENOPAUSAL SYNDROME (HOT FLASHES): ICD-10-CM

## 2022-04-21 DIAGNOSIS — B00.9 HERPES SIMPLEX VIRUS INFECTION: ICD-10-CM

## 2022-04-21 RX ORDER — VALACYCLOVIR HYDROCHLORIDE 500 MG/1
TABLET, FILM COATED ORAL
Qty: 24 TABLET | Refills: 3 | Status: SHIPPED | OUTPATIENT
Start: 2022-04-21

## 2022-04-21 NOTE — TELEPHONE ENCOUNTER
4-21-22  Reason for Call:  Medication     Do you use a [facility]?  [facility]    Name of the medication requested: valACYclovir (VALTREX) 500 MG tablet    Other request: none    Can we leave a detailed message on this number? YES    Phone number patient can be reached at: Cell number on file:    Telephone Information:   Mobile [phone]       Best Time: anthime    Call taken on 4/21/2022 at 9:24 AM by Shania Malloy

## 2022-04-22 RX ORDER — PAROXETINE 10 MG/1
TABLET, FILM COATED ORAL
Qty: 90 TABLET | Refills: 1 | Status: SHIPPED | OUTPATIENT
Start: 2022-04-22

## 2022-04-23 NOTE — TELEPHONE ENCOUNTER
"Last Written Prescription Date:  9/27/21  Last Fill Quantity: 90,  # refills: 1   Last office visit provider:  9/27/21     Requested Prescriptions   Pending Prescriptions Disp Refills     PARoxetine (PAXIL) 10 MG tablet [Pharmacy Med Name: PAROXETINE 10MG TABLETS] 90 tablet 1     Sig: TAKE 1 TABLET(10 MG) BY MOUTH EVERY MORNING       SSRIs Protocol Passed - 4/20/2022  8:41 AM        Passed - Recent (12 mo) or future (30 days) visit within the authorizing provider's specialty     Patient has had an office visit with the authorizing provider or a provider within the authorizing providers department within the previous 12 mos or has a future within next 30 days. See \"Patient Info\" tab in inbasket, or \"Choose Columns\" in Meds & Orders section of the refill encounter.              Passed - Medication is active on med list        Passed - Patient is age 18 or older        Passed - No active pregnancy on record        Passed - No positive pregnancy test in last 12 months             Adeline Healy 04/22/22 8:39 PM  "

## 2022-07-20 ENCOUNTER — ANCILLARY PROCEDURE (OUTPATIENT)
Dept: MAMMOGRAPHY | Facility: HOSPITAL | Age: 52
End: 2022-07-20
Attending: FAMILY MEDICINE
Payer: COMMERCIAL

## 2022-07-20 DIAGNOSIS — Z12.31 VISIT FOR SCREENING MAMMOGRAM: ICD-10-CM

## 2022-07-20 PROCEDURE — 77067 SCR MAMMO BI INCL CAD: CPT
